# Patient Record
Sex: FEMALE
[De-identification: names, ages, dates, MRNs, and addresses within clinical notes are randomized per-mention and may not be internally consistent; named-entity substitution may affect disease eponyms.]

---

## 2021-05-10 NOTE — NUR
05/10/21 0822 Raza reza
FIRST IV IN RIGHT HAND BY MA BLEW
SECOND IV IV RIGHT ARM BY RN BLEW
THIRD IV IN LEFT HAND BY MA WORKED

## 2022-10-24 ENCOUNTER — HOSPITAL ENCOUNTER (OUTPATIENT)
Dept: HOSPITAL 95 - ORSCSDS | Age: 57
Discharge: HOME | End: 2022-10-24
Attending: STUDENT IN AN ORGANIZED HEALTH CARE EDUCATION/TRAINING PROGRAM
Payer: COMMERCIAL

## 2022-10-24 VITALS — BODY MASS INDEX: 29.74 KG/M2 | WEIGHT: 196.21 LBS | HEIGHT: 68 IN

## 2022-10-24 DIAGNOSIS — I10: ICD-10-CM

## 2022-10-24 DIAGNOSIS — Z79.899: ICD-10-CM

## 2022-10-24 DIAGNOSIS — D12.3: ICD-10-CM

## 2022-10-24 DIAGNOSIS — Z86.010: Primary | ICD-10-CM

## 2022-10-24 DIAGNOSIS — K57.30: ICD-10-CM

## 2022-10-24 DIAGNOSIS — D12.2: ICD-10-CM

## 2022-10-24 DIAGNOSIS — K63.5: ICD-10-CM

## 2022-10-24 PROCEDURE — 0DBL8ZX EXCISION OF TRANSVERSE COLON, VIA NATURAL OR ARTIFICIAL OPENING ENDOSCOPIC, DIAGNOSTIC: ICD-10-PCS | Performed by: STUDENT IN AN ORGANIZED HEALTH CARE EDUCATION/TRAINING PROGRAM

## 2022-10-24 PROCEDURE — 0DBN8ZX EXCISION OF SIGMOID COLON, VIA NATURAL OR ARTIFICIAL OPENING ENDOSCOPIC, DIAGNOSTIC: ICD-10-PCS | Performed by: STUDENT IN AN ORGANIZED HEALTH CARE EDUCATION/TRAINING PROGRAM

## 2022-10-24 PROCEDURE — 0DBK8ZX EXCISION OF ASCENDING COLON, VIA NATURAL OR ARTIFICIAL OPENING ENDOSCOPIC, DIAGNOSTIC: ICD-10-PCS | Performed by: STUDENT IN AN ORGANIZED HEALTH CARE EDUCATION/TRAINING PROGRAM

## 2022-10-24 NOTE — NUR
10/24/22 1136 Juliette Rosales
TWO ATTEMPTS AT IV BY MA. FIRST ATTEMPT AT IV IN R HAND BY MA
 INFILTRATED. SECOND
ATTEMPT AT IV BY MA IN LEFT HAND SUCESSFUL.